# Patient Record
Sex: MALE | Race: ASIAN | Employment: STUDENT | URBAN - METROPOLITAN AREA
[De-identification: names, ages, dates, MRNs, and addresses within clinical notes are randomized per-mention and may not be internally consistent; named-entity substitution may affect disease eponyms.]

---
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
## 2021-05-22 ENCOUNTER — APPOINTMENT (OUTPATIENT)
Dept: CT IMAGING | Facility: HOSPITAL | Age: 21
End: 2021-05-22
Attending: EMERGENCY MEDICINE
Payer: COMMERCIAL

## 2021-05-22 ENCOUNTER — HOSPITAL ENCOUNTER (EMERGENCY)
Facility: HOSPITAL | Age: 21
Discharge: HOME | End: 2021-05-22
Attending: EMERGENCY MEDICINE
Payer: COMMERCIAL

## 2021-05-22 VITALS
WEIGHT: 100.31 LBS | DIASTOLIC BLOOD PRESSURE: 76 MMHG | SYSTOLIC BLOOD PRESSURE: 116 MMHG | RESPIRATION RATE: 16 BRPM | OXYGEN SATURATION: 100 % | TEMPERATURE: 97 F | HEART RATE: 72 BPM

## 2021-05-22 DIAGNOSIS — S09.90XA MINOR HEAD INJURY, INITIAL ENCOUNTER: Primary | ICD-10-CM

## 2021-05-22 PROCEDURE — 99284 EMERGENCY DEPT VISIT MOD MDM: CPT

## 2021-05-22 PROCEDURE — 70450 CT HEAD/BRAIN W/O DYE: CPT | Performed by: EMERGENCY MEDICINE

## 2021-05-22 NOTE — ED INITIAL ASSESSMENT (HPI)
Sitting in a chair after his covid shot and passed out and feel out of chair. He hit his head on the floor.

## 2021-05-22 NOTE — ED PROVIDER NOTES
Patient Seen in: [facility] Emergency Department      History   Patient presents with:  Fall    Stated Complaint: fall    HPI/Subjective:   HPI    Patient is a 63-year-old male presenting after syncopal episode.   He had his first Covid vaccine today criteria. Cardiovascular:      Rate and Rhythm: Normal rate and regular rhythm. Heart sounds: Normal heart sounds. Pulmonary:      Effort: Pulmonary effort is normal.      Breath sounds: Normal breath sounds.    Abdominal:      General: Bowel sounds Dictated by (CST): Delfino Hernandez MD on 5/22/2021 at 2:16 PM     Finalized by (CST): Delfino Hernandez MD on 5/22/2021 at 2:19 PM              MDM      41-year-old male presenting after syncopal episode after getting his first Covid vaccine today.   Pr

## 2022-10-10 ENCOUNTER — OFFICE VISIT (OUTPATIENT)
Dept: INTERNAL MEDICINE CLINIC | Facility: CLINIC | Age: 22
End: 2022-10-10
Payer: COMMERCIAL

## 2022-10-10 VITALS
SYSTOLIC BLOOD PRESSURE: 112 MMHG | HEART RATE: 68 BPM | BODY MASS INDEX: 17.34 KG/M2 | RESPIRATION RATE: 18 BRPM | OXYGEN SATURATION: 99 % | HEIGHT: 72 IN | DIASTOLIC BLOOD PRESSURE: 64 MMHG | WEIGHT: 128 LBS

## 2022-10-10 DIAGNOSIS — L70.0 ACNE VULGARIS: Primary | ICD-10-CM

## 2022-10-10 DIAGNOSIS — L21.9 SEBORRHEIC DERMATITIS OF SCALP: ICD-10-CM

## 2022-10-10 DIAGNOSIS — Z23 ENCOUNTER FOR VACCINATION: ICD-10-CM

## 2022-10-10 DIAGNOSIS — L30.9 CHRONIC ECZEMA: ICD-10-CM

## 2022-10-10 PROCEDURE — 3074F SYST BP LT 130 MM HG: CPT | Performed by: INTERNAL MEDICINE

## 2022-10-10 PROCEDURE — 3078F DIAST BP <80 MM HG: CPT | Performed by: INTERNAL MEDICINE

## 2022-10-10 PROCEDURE — 90471 IMMUNIZATION ADMIN: CPT | Performed by: INTERNAL MEDICINE

## 2022-10-10 PROCEDURE — 90715 TDAP VACCINE 7 YRS/> IM: CPT | Performed by: INTERNAL MEDICINE

## 2022-10-10 PROCEDURE — 3008F BODY MASS INDEX DOCD: CPT | Performed by: INTERNAL MEDICINE

## 2022-10-10 PROCEDURE — 99203 OFFICE O/P NEW LOW 30 MIN: CPT | Performed by: INTERNAL MEDICINE

## 2023-10-25 ENCOUNTER — OFFICE VISIT (OUTPATIENT)
Dept: SURGERY | Facility: CLINIC | Age: 23
End: 2023-10-25

## 2023-10-25 DIAGNOSIS — R82.90 URINE FINDING: ICD-10-CM

## 2023-10-25 DIAGNOSIS — N50.812 LEFT TESTICULAR PAIN: Primary | ICD-10-CM

## 2023-10-25 PROCEDURE — 99203 OFFICE O/P NEW LOW 30 MIN: CPT | Performed by: PHYSICIAN ASSISTANT

## 2023-10-25 NOTE — PROGRESS NOTES
[facility], [address], [address] [facility]    Urology Consult Note    History of Present Illness:   Patient is a 25year old male with hx of acne, who presents today for evaluation of left testicular pain/swelling. 2 weeks of scrotal swelling and sensitivity. A few days prior to onset had gone running. No a/a factors known. No prior episodes of similar symptoms. Has had some urinary urgency approximately the last week, no bother currently. No dysuria, gross hematuria. Not sexually active. HISTORY:  No past medical history on file. No past surgical history on file. No family history on file. Social History:   Social History     Socioeconomic History    Marital status: Single   Tobacco Use    Smoking status: Never    Smokeless tobacco: Never   Vaping Use    Vaping Use: Never used   Substance and Sexual Activity    Alcohol use: No    Drug use: No        Allergies    Diphenhydramine             Review of Systems:   A 10-point review of systems was completed and is negative other than as noted above. Physical Exam:   There were no vitals taken for this visit. GENERAL APPEARANCE: well developed, well nourished, in no acute distress  NEUROLOGIC: no localizing neurologic signs, alert and oriented x 3, converses appropriately  HEAD: atraumatic, normocephalic  EYES: sclera non-icteric  ORAL CAVITY: mucosa moist  NECK/THYROID: no obvious masses or goiter  LUNGS: non-labored breathing  Abdomen soft NTND  CVA: no CVA tenderness  INGUINAL CANALS: no hernias  PENILE MEATUS: open and in normal location  PENIS normal  SCROTUM: normal  no varicocele  TESTES: normal anatomy  EPIDIDYMIS: normal anatomy  EXTREMITIES: warm, well-perfused. No clubbing, cyanosis or edema.   SKIN: no obvious rashes    Results:     Laboratory Data:  No results found for: \"WBC\", \"HGB\", \"PLT\"  No results found for: \"NA\", \"K\", \"CL\", \"CO2\", \"BUN\", \"CREATININE\", \"GLU\", \"GFRAA\", \"AST\", \"ALT\", \"TP\", \"ALB\", \"PHOS\", \"CA\", \"MG\"    Urinalysis Results (last three years):  No results for input(s)  in the last 3 years    Urine Culture Results (last three years):  No results found for: \"URINECUL\"    Imaging  No results found. Impression:     Patient is a 25year old male with hx of acne, who presents today for evaluation of left testicular pain/swelling. We discussed that he may have a component of epididymitis. We reviewed epididymitis treatment and prevention strategies and I provided and reviewed educational materials for this. I recommend he drink plenty of fluids and try prn NSAIDs, wear an athletic supporter and try to avoid activities which exacerbate pain such as prolonged sitting or heavy lifting, try hot baths/hot packs. Recommendations:  Scrotal ultrasound. NSAIDS. Follow up prn. Thank you very much for this consult. Please call if there are any questions or concerns.      Yulisa Hernandez PA-C  Urology  [facility]    Date: 10/25/2023

## 2023-10-25 NOTE — PATIENT INSTRUCTIONS
Epididymitis and Testicular Pain  The epididymis is a small tube next to the testicle that stores sperm. Inflammation of the epididymis can cause pain and swelling in your scrotum. The condition may be acute (sudden onset) or chronic (persisting for a longer period of time or recurrent). - Acute epididymitis is typically characterized by sudden onset pain, tenderness, swelling and redness.  - Chronic epididymitis is typically characterized by intermittent or long-term dull ache in one or both testicles but the pain can become severe at times. Causes  - Acute epididymitis is often caused by an infection. In sexually active men, it is often caused by a sexually transmitted disease (STD) such as chlamydia or gonorrhea. In boys and in men over 36, it can be from bacteria from other parts of the urinary tract (not an STD infection). It may also be caused by trauma. - Chronic epididymitis often may not be associated with an infectious process. Things that may irritate the testicles include sitting for long periods of time, squats or dead-lifts, motorcycles or biking, inflammatory disorders of the pelvis and other painful conditions that can affect the pelvis such as chronic low back pain. Constipation and other bowel-related issues can contribute to chronic testicular pain. Symptoms may begin with pain in the lower belly (abdomen) or low back. The pain then spreads down into the scrotum. Usually only one side is affected. The testicle and scrotum swell and become very painful and red. You may have fever and a burning when passing urine. Sometimes you may have a discharge from the penis. Treatment is typically with antibiotics, and anti-inflammatory and pain medicines. The condition should get better over the first few days of treatment. But it will take several weeks for all the swelling and discomfort to go away.  If your healthcare provider suspects that an STD is the cause, your sexual partners may need to be treated. Home care  The following will help you care for yourself at home:  Support the scrotum. When lying down, place a rolled towel under the scrotum. When walking, use an athletic supporter or 2 pairs of jockey-style underwear. Avoid sitting at a 90 degree angle for long periods of time. Standing or reclining is preferable. Avoid sitting on anything that shakes (tractors, lawn-mowers, long car rides) if possible. You may use a donut while sitting to avoid excessive pressure on the testicles while sitting. To relieve pain, put ice packs on the inflamed area. You can make your own ice pack by putting ice cubes in a sealed plastic bag wrapped in a thin towel. Soaking in a hot bath or using a heating pad may help alleviated discomfort in men suffering from chronic epididymitis. Would avoid excessive heat in the setting of acute epididymitis. You may use over-the-counter medicines to control pain, unless another medicine was given. Ibuprofen is typically a very effective anti-inflammatory pain medication. You may take 400 mg twice daily with plenty of water as needed for discomfort. If you have chronic liver or kidney disease, talk with your healthcare provider before taking these medicines. Also talk with your provider if you've ever had a stomach ulcer or GI bleeding. Make sure chronic pain issues are under good control, especially back pain issues as this is a significant risk factor for chronic testicular/pelvic pain. Talk to your primary care or back specialist if your pain is not under adequate control. Constipation can make you strain. This makes the pain worse. Avoid constipation by eating natural laxatives such as prunes, fresh fruits, and whole-grain cereals. If necessary, use a mild over-the-counter laxative such as colace for constipation. Mineral oil can be used to keep the stools soft.

## 2023-10-27 ENCOUNTER — HOSPITAL ENCOUNTER (OUTPATIENT)
Dept: ULTRASOUND IMAGING | Age: 23
Discharge: HOME | End: 2023-10-27
Attending: PHYSICIAN ASSISTANT

## 2023-10-27 DIAGNOSIS — N50.812 LEFT TESTICULAR PAIN: ICD-10-CM

## 2023-10-27 PROCEDURE — 93975 VASCULAR STUDY: CPT | Performed by: PHYSICIAN ASSISTANT

## 2023-10-27 PROCEDURE — 76870 US EXAM SCROTUM: CPT | Performed by: PHYSICIAN ASSISTANT
